# Patient Record
Sex: FEMALE | URBAN - METROPOLITAN AREA
[De-identification: names, ages, dates, MRNs, and addresses within clinical notes are randomized per-mention and may not be internally consistent; named-entity substitution may affect disease eponyms.]

---

## 2023-02-07 ENCOUNTER — OFFICE VISIT (OUTPATIENT)
Dept: URBAN - METROPOLITAN AREA CLINIC 40 | Facility: CLINIC | Age: 43
End: 2023-02-07
Payer: COMMERCIAL

## 2023-02-07 DIAGNOSIS — H04.123 DRY EYE SYNDROME OF BILATERAL LACRIMAL GLANDS: Primary | ICD-10-CM

## 2023-02-07 DIAGNOSIS — H10.13 ACUTE BILATERAL ATOPIC CONJUNCTIVITIS: ICD-10-CM

## 2023-02-07 PROCEDURE — 99204 OFFICE O/P NEW MOD 45 MIN: CPT | Performed by: OPTOMETRIST

## 2023-02-07 RX ORDER — LOTEPREDNOL ETABONATE 2.5 MG/ML
0.25 % SUSPENSION/ DROPS OPHTHALMIC
Qty: 5 | Refills: 1 | Status: ACTIVE
Start: 2023-02-07

## 2023-02-07 ASSESSMENT — KERATOMETRY
OS: 43.38
OD: 43.13

## 2023-02-07 ASSESSMENT — INTRAOCULAR PRESSURE
OD: 12
OS: 13

## 2023-02-07 NOTE — IMPRESSION/PLAN
Impression: Acute bilateral atopic conjunctivitis: H10.13. Plan: Discussed treatment options with patient. Will continue to observe condition and or symptoms. Lid scrubs and hygiene were explained. Patient instructed to apply cold compresses. New medication(s) Rx given today.